# Patient Record
Sex: FEMALE | Race: WHITE | NOT HISPANIC OR LATINO | Employment: FULL TIME | URBAN - METROPOLITAN AREA
[De-identification: names, ages, dates, MRNs, and addresses within clinical notes are randomized per-mention and may not be internally consistent; named-entity substitution may affect disease eponyms.]

---

## 2021-06-10 ENCOUNTER — ANCILLARY PROCEDURE (OUTPATIENT)
Dept: ULTRASOUND IMAGING | Facility: CLINIC | Age: 21
End: 2021-06-10
Attending: EMERGENCY MEDICINE
Payer: COMMERCIAL

## 2021-06-10 ENCOUNTER — APPOINTMENT (OUTPATIENT)
Dept: CT IMAGING | Facility: CLINIC | Age: 21
End: 2021-06-10
Attending: EMERGENCY MEDICINE
Payer: COMMERCIAL

## 2021-06-10 ENCOUNTER — HOSPITAL ENCOUNTER (OUTPATIENT)
Facility: CLINIC | Age: 21
Setting detail: OBSERVATION
Discharge: HOME | End: 2021-06-11
Attending: EMERGENCY MEDICINE | Admitting: INTERNAL MEDICINE
Payer: COMMERCIAL

## 2021-06-10 DIAGNOSIS — R10.13 ABDOMINAL PAIN, EPIGASTRIC: Primary | ICD-10-CM

## 2021-06-10 DIAGNOSIS — R74.01 ELEVATED TRANSAMINASE LEVEL: ICD-10-CM

## 2021-06-10 LAB
ALBUMIN SERPL-MCNC: 3.7 G/DL (ref 3.4–5)
ALP SERPL-CCNC: 237 U/L (ref 40–150)
ALT SERPL W P-5'-P-CCNC: 253 U/L (ref 0–50)
ANION GAP SERPL CALCULATED.3IONS-SCNC: 10 MMOL/L (ref 3–14)
AST SERPL W P-5'-P-CCNC: ABNORMAL U/L (ref 0–45)
B-HCG SERPL-ACNC: <1 IU/L (ref 0–5)
BILIRUB SERPL-MCNC: 0.8 MG/DL (ref 0.2–1.3)
BUN SERPL-MCNC: 7 MG/DL (ref 7–30)
CALCIUM SERPL-MCNC: 9.2 MG/DL (ref 8.5–10.1)
CHLORIDE SERPL-SCNC: 101 MMOL/L (ref 94–109)
CO2 SERPL-SCNC: 21 MMOL/L (ref 20–32)
CREAT SERPL-MCNC: 0.78 MG/DL (ref 0.52–1.04)
GFR SERPL CREATININE-BSD FRML MDRD: >90 ML/MIN/{1.73_M2}
GLUCOSE SERPL-MCNC: 64 MG/DL (ref 70–99)
LACTATE BLD-SCNC: 1.3 MMOL/L (ref 0.7–2)
LIPASE SERPL-CCNC: 30 U/L (ref 73–393)
POTASSIUM SERPL-SCNC: 4 MMOL/L (ref 3.4–5.3)
PROT SERPL-MCNC: 7.8 G/DL (ref 6.8–8.8)
SODIUM SERPL-SCNC: 132 MMOL/L (ref 133–144)

## 2021-06-10 PROCEDURE — 82247 BILIRUBIN TOTAL: CPT

## 2021-06-10 PROCEDURE — 84702 CHORIONIC GONADOTROPIN TEST: CPT | Performed by: EMERGENCY MEDICINE

## 2021-06-10 PROCEDURE — 80048 BASIC METABOLIC PNL TOTAL CA: CPT

## 2021-06-10 PROCEDURE — 99285 EMERGENCY DEPT VISIT HI MDM: CPT | Mod: 25 | Performed by: EMERGENCY MEDICINE

## 2021-06-10 PROCEDURE — 76705 ECHO EXAM OF ABDOMEN: CPT | Mod: 26 | Performed by: EMERGENCY MEDICINE

## 2021-06-10 PROCEDURE — 250N000013 HC RX MED GY IP 250 OP 250 PS 637: Performed by: EMERGENCY MEDICINE

## 2021-06-10 PROCEDURE — 250N000011 HC RX IP 250 OP 636: Performed by: EMERGENCY MEDICINE

## 2021-06-10 PROCEDURE — 84155 ASSAY OF PROTEIN SERUM: CPT

## 2021-06-10 PROCEDURE — 96361 HYDRATE IV INFUSION ADD-ON: CPT | Performed by: EMERGENCY MEDICINE

## 2021-06-10 PROCEDURE — 84460 ALANINE AMINO (ALT) (SGPT): CPT

## 2021-06-10 PROCEDURE — 82040 ASSAY OF SERUM ALBUMIN: CPT

## 2021-06-10 PROCEDURE — 258N000003 HC RX IP 258 OP 636: Performed by: EMERGENCY MEDICINE

## 2021-06-10 PROCEDURE — 74177 CT ABD & PELVIS W/CONTRAST: CPT

## 2021-06-10 PROCEDURE — 96374 THER/PROPH/DIAG INJ IV PUSH: CPT | Performed by: EMERGENCY MEDICINE

## 2021-06-10 PROCEDURE — 76705 ECHO EXAM OF ABDOMEN: CPT | Performed by: EMERGENCY MEDICINE

## 2021-06-10 PROCEDURE — 83605 ASSAY OF LACTIC ACID: CPT | Performed by: EMERGENCY MEDICINE

## 2021-06-10 PROCEDURE — 83690 ASSAY OF LIPASE: CPT | Performed by: EMERGENCY MEDICINE

## 2021-06-10 PROCEDURE — 84075 ASSAY ALKALINE PHOSPHATASE: CPT

## 2021-06-10 PROCEDURE — 85025 COMPLETE CBC W/AUTO DIFF WBC: CPT | Performed by: EMERGENCY MEDICINE

## 2021-06-10 PROCEDURE — 74177 CT ABD & PELVIS W/CONTRAST: CPT | Mod: 26 | Performed by: RADIOLOGY

## 2021-06-10 RX ORDER — ONDANSETRON 2 MG/ML
4 INJECTION INTRAMUSCULAR; INTRAVENOUS ONCE
Status: COMPLETED | OUTPATIENT
Start: 2021-06-10 | End: 2021-06-10

## 2021-06-10 RX ORDER — ACETAMINOPHEN 500 MG
1000 TABLET ORAL ONCE
Status: COMPLETED | OUTPATIENT
Start: 2021-06-10 | End: 2021-06-10

## 2021-06-10 RX ORDER — NORETHINDRONE ACETATE AND ETHINYL ESTRADIOL 1; 5 MG/1; UG/1
1 TABLET ORAL DAILY
COMMUNITY

## 2021-06-10 RX ORDER — FLUTICASONE PROPIONATE 50 MCG
2 SPRAY, SUSPENSION (ML) NASAL DAILY
COMMUNITY
Start: 2020-06-01

## 2021-06-10 RX ORDER — IOPAMIDOL 755 MG/ML
75 INJECTION, SOLUTION INTRAVASCULAR ONCE
Status: COMPLETED | OUTPATIENT
Start: 2021-06-10 | End: 2021-06-10

## 2021-06-10 RX ADMIN — ACETAMINOPHEN 1000 MG: 500 TABLET, FILM COATED ORAL at 21:37

## 2021-06-10 RX ADMIN — ONDANSETRON 4 MG: 2 INJECTION INTRAMUSCULAR; INTRAVENOUS at 21:38

## 2021-06-10 RX ADMIN — SODIUM CHLORIDE 1000 ML: 9 INJECTION, SOLUTION INTRAVENOUS at 21:46

## 2021-06-10 RX ADMIN — IOPAMIDOL 75 ML: 755 INJECTION, SOLUTION INTRAVENOUS at 23:20

## 2021-06-10 RX ADMIN — SODIUM CHLORIDE 1000 ML: 9 INJECTION, SOLUTION INTRAVENOUS at 19:37

## 2021-06-10 ASSESSMENT — MIFFLIN-ST. JEOR: SCORE: 1223.09

## 2021-06-10 NOTE — ED TRIAGE NOTES
Pt comes in with two days abdominal pain, low grade temp (T100) for last four days, decreased appetite and vomiting. Went to urgent care yesterday and tested negative for COVID.

## 2021-06-11 ENCOUNTER — APPOINTMENT (OUTPATIENT)
Dept: ULTRASOUND IMAGING | Facility: CLINIC | Age: 21
End: 2021-06-11
Attending: PHYSICIAN ASSISTANT
Payer: COMMERCIAL

## 2021-06-11 VITALS
TEMPERATURE: 98.3 F | BODY MASS INDEX: 23.09 KG/M2 | HEART RATE: 81 BPM | DIASTOLIC BLOOD PRESSURE: 73 MMHG | SYSTOLIC BLOOD PRESSURE: 112 MMHG | HEIGHT: 61 IN | OXYGEN SATURATION: 100 % | RESPIRATION RATE: 16 BRPM | WEIGHT: 122.3 LBS

## 2021-06-11 DIAGNOSIS — R10.13 ABDOMINAL PAIN, EPIGASTRIC: Primary | ICD-10-CM

## 2021-06-11 DIAGNOSIS — R74.01 ELEVATED TRANSAMINASE LEVEL: ICD-10-CM

## 2021-06-11 LAB
ALBUMIN SERPL-MCNC: 2.8 G/DL (ref 3.4–5)
ALBUMIN UR-MCNC: 20 MG/DL
ALP SERPL-CCNC: 205 U/L (ref 40–150)
ALT SERPL W P-5'-P-CCNC: 227 U/L (ref 0–50)
ANION GAP SERPL CALCULATED.3IONS-SCNC: 10 MMOL/L (ref 3–14)
APPEARANCE UR: CLEAR
AST SERPL W P-5'-P-CCNC: 207 U/L (ref 0–45)
BASOPHILS # BLD AUTO: 0 10E9/L (ref 0–0.2)
BASOPHILS NFR BLD AUTO: 0 %
BILIRUB SERPL-MCNC: 0.8 MG/DL (ref 0.2–1.3)
BILIRUB UR QL STRIP: NEGATIVE
BUN SERPL-MCNC: 7 MG/DL (ref 7–30)
CALCIUM SERPL-MCNC: 7.5 MG/DL (ref 8.5–10.1)
CHLORIDE SERPL-SCNC: 110 MMOL/L (ref 94–109)
CMV IGG SERPL QL IA: <0.2 AI (ref 0–0.8)
CMV IGM SERPL QL IA: 0.8 AI (ref 0–0.8)
CO2 SERPL-SCNC: 17 MMOL/L (ref 20–32)
COLOR UR AUTO: YELLOW
CREAT SERPL-MCNC: 0.71 MG/DL (ref 0.52–1.04)
DIFFERENTIAL METHOD BLD: ABNORMAL
EBV VCA IGG SER QL IA: <0.2 AI (ref 0–0.8)
EOSINOPHIL # BLD AUTO: 0 10E9/L (ref 0–0.7)
EOSINOPHIL NFR BLD AUTO: 0 %
ERYTHROCYTE [DISTWIDTH] IN BLOOD BY AUTOMATED COUNT: 11.8 % (ref 10–15)
ERYTHROCYTE [DISTWIDTH] IN BLOOD BY AUTOMATED COUNT: 11.9 % (ref 10–15)
GFR SERPL CREATININE-BSD FRML MDRD: >90 ML/MIN/{1.73_M2}
GLUCOSE BLDC GLUCOMTR-MCNC: 62 MG/DL (ref 70–99)
GLUCOSE SERPL-MCNC: 57 MG/DL (ref 70–99)
GLUCOSE UR STRIP-MCNC: NEGATIVE MG/DL
HAV IGG SER QL IA: NONREACTIVE
HAV IGM SERPL QL IA: NONREACTIVE
HBV CORE IGM SERPL QL IA: NONREACTIVE
HBV SURFACE AB SERPL IA-ACNC: 3.88 M[IU]/ML
HBV SURFACE AG SERPL QL IA: NONREACTIVE
HCT VFR BLD AUTO: 35.8 % (ref 35–47)
HCT VFR BLD AUTO: 42.9 % (ref 35–47)
HCV AB SERPL QL IA: NONREACTIVE
HGB BLD-MCNC: 11.8 G/DL (ref 11.7–15.7)
HGB BLD-MCNC: 14.4 G/DL (ref 11.7–15.7)
HGB UR QL STRIP: NEGATIVE
INR PPP: 1.12 (ref 0.86–1.14)
KETONES UR STRIP-MCNC: 150 MG/DL
LEUKOCYTE ESTERASE UR QL STRIP: NEGATIVE
LYMPHOCYTES # BLD AUTO: 5.3 10E9/L (ref 0.8–5.3)
LYMPHOCYTES NFR BLD AUTO: 65 %
MCH RBC QN AUTO: 29 PG (ref 26.5–33)
MCH RBC QN AUTO: 29.1 PG (ref 26.5–33)
MCHC RBC AUTO-ENTMCNC: 33 G/DL (ref 31.5–36.5)
MCHC RBC AUTO-ENTMCNC: 33.6 G/DL (ref 31.5–36.5)
MCV RBC AUTO: 87 FL (ref 78–100)
MCV RBC AUTO: 88 FL (ref 78–100)
MONOCYTES # BLD AUTO: 1.7 10E9/L (ref 0–1.3)
MONOCYTES NFR BLD AUTO: 21 %
MUCOUS THREADS #/AREA URNS LPF: PRESENT /LPF
NEUTROPHILS # BLD AUTO: 1.1 10E9/L (ref 1.6–8.3)
NEUTROPHILS NFR BLD AUTO: 14 %
NITRATE UR QL: NEGATIVE
PH UR STRIP: 6 PH (ref 5–7)
PLATELET # BLD AUTO: 117 10E9/L (ref 150–450)
PLATELET # BLD AUTO: 92 10E9/L (ref 150–450)
POTASSIUM SERPL-SCNC: 3.7 MMOL/L (ref 3.4–5.3)
PROT SERPL-MCNC: 5.9 G/DL (ref 6.8–8.8)
RBC # BLD AUTO: 4.06 10E12/L (ref 3.8–5.2)
RBC # BLD AUTO: 4.96 10E12/L (ref 3.8–5.2)
RBC #/AREA URNS AUTO: 4 /HPF (ref 0–2)
SODIUM SERPL-SCNC: 137 MMOL/L (ref 133–144)
SOURCE: ABNORMAL
SP GR UR STRIP: 1.01 (ref 1–1.03)
SQUAMOUS #/AREA URNS AUTO: 1 /HPF (ref 0–1)
UROBILINOGEN UR STRIP-MCNC: NORMAL MG/DL (ref 0–2)
VARIANT LYMPHS BLD QL SMEAR: PRESENT
WBC # BLD AUTO: 5.2 10E9/L (ref 4–11)
WBC # BLD AUTO: 8.1 10E9/L (ref 4–11)
WBC #/AREA URNS AUTO: 1 /HPF (ref 0–5)

## 2021-06-11 PROCEDURE — 99204 OFFICE O/P NEW MOD 45 MIN: CPT | Mod: GC | Performed by: STUDENT IN AN ORGANIZED HEALTH CARE EDUCATION/TRAINING PROGRAM

## 2021-06-11 PROCEDURE — 99235 HOSP IP/OBS SAME DATE MOD 70: CPT | Performed by: INTERNAL MEDICINE

## 2021-06-11 PROCEDURE — 85610 PROTHROMBIN TIME: CPT | Performed by: STUDENT IN AN ORGANIZED HEALTH CARE EDUCATION/TRAINING PROGRAM

## 2021-06-11 PROCEDURE — 36415 COLL VENOUS BLD VENIPUNCTURE: CPT | Performed by: EMERGENCY MEDICINE

## 2021-06-11 PROCEDURE — 85027 COMPLETE CBC AUTOMATED: CPT | Performed by: NURSE PRACTITIONER

## 2021-06-11 PROCEDURE — 250N000013 HC RX MED GY IP 250 OP 250 PS 637: Performed by: NURSE PRACTITIONER

## 2021-06-11 PROCEDURE — G0378 HOSPITAL OBSERVATION PER HR: HCPCS

## 2021-06-11 PROCEDURE — 86708 HEPATITIS A ANTIBODY: CPT | Performed by: EMERGENCY MEDICINE

## 2021-06-11 PROCEDURE — 80053 COMPREHEN METABOLIC PANEL: CPT | Performed by: NURSE PRACTITIONER

## 2021-06-11 PROCEDURE — 86709 HEPATITIS A IGM ANTIBODY: CPT | Performed by: STUDENT IN AN ORGANIZED HEALTH CARE EDUCATION/TRAINING PROGRAM

## 2021-06-11 PROCEDURE — 96376 TX/PRO/DX INJ SAME DRUG ADON: CPT

## 2021-06-11 PROCEDURE — 86644 CMV ANTIBODY: CPT | Performed by: EMERGENCY MEDICINE

## 2021-06-11 PROCEDURE — 93975 VASCULAR STUDY: CPT | Mod: 26 | Performed by: RADIOLOGY

## 2021-06-11 PROCEDURE — 86665 EPSTEIN-BARR CAPSID VCA: CPT | Performed by: NURSE PRACTITIONER

## 2021-06-11 PROCEDURE — 86645 CMV ANTIBODY IGM: CPT | Performed by: STUDENT IN AN ORGANIZED HEALTH CARE EDUCATION/TRAINING PROGRAM

## 2021-06-11 PROCEDURE — 36415 COLL VENOUS BLD VENIPUNCTURE: CPT | Performed by: NURSE PRACTITIONER

## 2021-06-11 PROCEDURE — 81001 URINALYSIS AUTO W/SCOPE: CPT | Performed by: EMERGENCY MEDICINE

## 2021-06-11 PROCEDURE — 36415 COLL VENOUS BLD VENIPUNCTURE: CPT | Performed by: STUDENT IN AN ORGANIZED HEALTH CARE EDUCATION/TRAINING PROGRAM

## 2021-06-11 PROCEDURE — 999N001017 HC STATISTIC GLUCOSE BY METER IP

## 2021-06-11 PROCEDURE — 86803 HEPATITIS C AB TEST: CPT | Performed by: EMERGENCY MEDICINE

## 2021-06-11 PROCEDURE — 250N000011 HC RX IP 250 OP 636: Performed by: NURSE PRACTITIONER

## 2021-06-11 PROCEDURE — 99217 PR OBSERVATION CARE DISCHARGE: CPT | Performed by: PHYSICIAN ASSISTANT

## 2021-06-11 PROCEDURE — 258N000003 HC RX IP 258 OP 636: Performed by: NURSE PRACTITIONER

## 2021-06-11 PROCEDURE — 86705 HEP B CORE ANTIBODY IGM: CPT | Performed by: STUDENT IN AN ORGANIZED HEALTH CARE EDUCATION/TRAINING PROGRAM

## 2021-06-11 PROCEDURE — 93975 VASCULAR STUDY: CPT

## 2021-06-11 PROCEDURE — 87340 HEPATITIS B SURFACE AG IA: CPT | Performed by: EMERGENCY MEDICINE

## 2021-06-11 PROCEDURE — 86706 HEP B SURFACE ANTIBODY: CPT | Performed by: EMERGENCY MEDICINE

## 2021-06-11 RX ORDER — IBUPROFEN 600 MG/1
600 TABLET, FILM COATED ORAL EVERY 6 HOURS PRN
Status: DISCONTINUED | OUTPATIENT
Start: 2021-06-11 | End: 2021-06-11 | Stop reason: HOSPADM

## 2021-06-11 RX ORDER — ONDANSETRON 4 MG/1
4 TABLET, ORALLY DISINTEGRATING ORAL EVERY 6 HOURS PRN
Qty: 10 TABLET | Refills: 0 | Status: SHIPPED | OUTPATIENT
Start: 2021-06-11

## 2021-06-11 RX ORDER — ONDANSETRON 2 MG/ML
4 INJECTION INTRAMUSCULAR; INTRAVENOUS EVERY 6 HOURS PRN
Status: DISCONTINUED | OUTPATIENT
Start: 2021-06-11 | End: 2021-06-11 | Stop reason: HOSPADM

## 2021-06-11 RX ORDER — LIDOCAINE 40 MG/G
CREAM TOPICAL
Status: DISCONTINUED | OUTPATIENT
Start: 2021-06-11 | End: 2021-06-11 | Stop reason: HOSPADM

## 2021-06-11 RX ORDER — SODIUM CHLORIDE 9 MG/ML
INJECTION, SOLUTION INTRAVENOUS CONTINUOUS
Status: DISCONTINUED | OUTPATIENT
Start: 2021-06-11 | End: 2021-06-11 | Stop reason: HOSPADM

## 2021-06-11 RX ORDER — PROCHLORPERAZINE MALEATE 10 MG
10 TABLET ORAL EVERY 6 HOURS PRN
Status: DISCONTINUED | OUTPATIENT
Start: 2021-06-11 | End: 2021-06-11 | Stop reason: HOSPADM

## 2021-06-11 RX ORDER — ONDANSETRON 4 MG/1
4 TABLET, ORALLY DISINTEGRATING ORAL EVERY 6 HOURS PRN
Status: DISCONTINUED | OUTPATIENT
Start: 2021-06-11 | End: 2021-06-11 | Stop reason: HOSPADM

## 2021-06-11 RX ORDER — PROCHLORPERAZINE 25 MG
25 SUPPOSITORY, RECTAL RECTAL EVERY 12 HOURS PRN
Status: DISCONTINUED | OUTPATIENT
Start: 2021-06-11 | End: 2021-06-11 | Stop reason: HOSPADM

## 2021-06-11 RX ADMIN — IBUPROFEN 600 MG: 600 TABLET, FILM COATED ORAL at 05:11

## 2021-06-11 RX ADMIN — SODIUM CHLORIDE 1000 ML: 9 INJECTION, SOLUTION INTRAVENOUS at 00:50

## 2021-06-11 RX ADMIN — ONDANSETRON 4 MG: 2 INJECTION INTRAMUSCULAR; INTRAVENOUS at 05:11

## 2021-06-11 RX ADMIN — SODIUM CHLORIDE: 9 INJECTION, SOLUTION INTRAVENOUS at 10:39

## 2021-06-11 RX ADMIN — ONDANSETRON 4 MG: 2 INJECTION INTRAMUSCULAR; INTRAVENOUS at 11:30

## 2021-06-11 RX ADMIN — IBUPROFEN 600 MG: 600 TABLET, FILM COATED ORAL at 11:31

## 2021-06-11 RX ADMIN — SODIUM CHLORIDE: 9 INJECTION, SOLUTION INTRAVENOUS at 01:42

## 2021-06-11 ASSESSMENT — MIFFLIN-ST. JEOR: SCORE: 1247.13

## 2021-06-11 NOTE — PROGRESS NOTES
"Patient interviewed and examined. Labs, imaging and chart notes reviewed.  Jennifer Mejia presented to the ED yesterday with 5 days of epigastric abdominal pain worse with eating, nausea, vomiting, subjective fevers, facial pain and pressure. She was found to have elevated LFTS. RUQ US was normal. Abdominal CT had normal appendix. Free fluid in the pelvis and unremarkable liver and gallbladder. Hepatitis A, B, and C serology is negative. She had reactive lymphocytes on blood smear. CMV antigen is negative. EBV capsid Elis is negative. Monospot and early antigen is pending.     Today, she is feeling improved. Nausea and abdominal pain are both improved. She has no further nausea. She is tolerating liquids.     Past Medical History:   Diagnosis Date     Migraine        History reviewed. No pertinent surgical history.    History reviewed. No pertinent family history.    Social History     Tobacco Use     Smoking status: Never Smoker     Smokeless tobacco: Never Used   Substance Use Topics     Alcohol use: Yes     Comment: occasional     Physical Exam:  Young female in NAD.  /73 (BP Location: Left arm)   Pulse 81   Temp 98.3  F (36.8  C) (Oral)   Resp 16   Ht 1.549 m (5' 1\")   Wt 55.5 kg (122 lb 4.8 oz)   SpO2 100%   BMI 23.11 kg/m    HEENT: PERRLA. EOMI.  Neck: Supple. No adenopathy.  Lungs: Clear to auscultation.  Cardiac: RRR. Normal S1 and S2. No murmurs.   Abdomen: Soft, non tender. No HSM.   Extrem: No edema.  Skin: No rash.  Neuro: Non focal.  Psych: Normal mood and affect.    Impression:  Young woman presents with 5 days of abdominal pain, nausea, vomiting, mildly elevated LFTs, Nasal congestion and reactive lymphocytes. She was vaccinated for Covid in April with [employer] vaccine. Serology for CMV, EBV, Hepatitis A, B, and C are negative, though EBV early antigen is pending. RUQ US was normal other than mild splenomegaly. Covid-19 was negative in urgent care 2 days ago. She has no exposure to " medications, alcohol, herbal medications. Overall, her picture is most suggestive of  is consistent with viral syndrome with mononucleosis like picture. We are awaiting final recommendations from GI Hepatology. She likely can be discharged to home later today with close follow up in clinic with recheck LFTs early next week.

## 2021-06-11 NOTE — PLAN OF CARE
Observation Goals:      List all goals to be met before discharge home   - Nausea/vomiting (diarrhea if present) improved: improving, pt denies nausea at this time.   - Tolerating oral intake to maintain hydration:met, pt able to tolerate sips of applejuice and water   - Vital signs normal or at patient baseline and orthostatic vitals are normal and patient not lightheaded with standing: met   - Diagnostic tests and consults completed and resulted if ordered: met  - Adequate pain control on oral analgesia: met   - Tolerating oral antibiotics if ordered: NA   - Safe disposition plan has been identified: met   -Nurse to notify provider when observation goals have been met and patient is ready for discharge.

## 2021-06-11 NOTE — PLAN OF CARE
Observation Goals:     List all goals to be met before discharge home   - Nausea/vomiting (diarrhea if present) improved: improving, intermittent nausea improved with zofran.   - Tolerating oral intake to maintain hydration: IV fluids infusing, pt is NPO except ice chips   - Vital signs normal or at patient baseline and orthostatic vitals are normal and patient not lightheaded with standing: met   - Diagnostic tests and consults completed and resulted if ordered: not met, ultrasound to be completed   - Adequate pain control on oral analgesia: met   - Tolerating oral antibiotics if ordered: NA   - Safe disposition plan has been identified: met   -Nurse to notify provider when observation goals have been met and patient is ready for discharge.

## 2021-06-11 NOTE — DISCHARGE SUMMARY
"ED Observation Discharge Summary    Jennifer Mejia   MRN# [id number]  Age: 21 year old   YOB: 2000            Date of Admission:  6/10/2021    Date of Discharge:  6/11/2021  Admitting Physician:  Nelson Frausto MD  Discharge Physician: Nelson Frausto MD  NP/PA: Jeanine Jalloh PA-C      DISCHARGE DIAGNOSIS:     Abdominal pain, epigastric    Elevated transaminase level    * No resolved hospital problems. *      INTERVAL HISTORY:    PHYSICAL EXAM:   Blood pressure 112/73, pulse 81, temperature 98.3  F (36.8  C), temperature source Oral, resp. rate 16, height 1.549 m (5' 1\"), weight 55.5 kg (122 lb 4.8 oz), SpO2 100 %.     GENERAL APPEARENCE: Pleasant, generally appears well, A/O x4. NAD.  SKIN: Clean, dry, and intact without visible lesions, rash, jaundice, cyanosis, erythema, ecchymoses to exposed areas. No hair or nail changes.  HEENT/NECK: NCAT w/out masses, lesions, or abnormalities. Sclera anicteric, PERRLA, EOMI.  Oral mucosa pink and moist without erythema, exudate, lesions, ulcerations, or thrush. Teeth and gums normal. Neck supple, no masses. No jugular venous distention.   CARDIOVASCULAR: S1, S2 RRR. No murmurs, rubs, or gallops.   RESPIRATORY: Respiratory effort WNL. CTA  bilaterally without crackles/rales/wheeze   GI: Active BS in all 4 quadrants. Abdomen soft and non-tender. No masses or hepatosplenomegaly.  : Deferred  MUSCULOSKELETAL: Gait is steady. Strength 5/5 in major muscle groups of bilateral UE and LE.  Extremities normal, no gross deformities noted, non-tender to palpation.   PV: 2+ bilateral radial and pedal pulses. No edema noted.   NEURO: CN II-XII grossly intact. Speech normal. Appropriate throughout interview.   Sensation grossly WNL. Finger to nose and rapid alternating movements WDL  HEME/LYMPH: No visible bleeding. No palpable submandibular, submental, pre or postauricular, occipital, cervical, or supraclavicular lymph nodes  PSYCHIATRIC: Mentation " and affect appear normal  VASCULAR ACCESS: CDI without erythema or discharge. Non-tender.    PROCEDURES AND IMAGING:   Results for orders placed or performed during the hospital encounter of 06/10/21 (from the past 24 hour(s))   Lipase   Result Value Ref Range    Lipase 30 (L) 73 - 393 U/L   CBC with platelets differential   Result Value Ref Range    WBC 8.1 4.0 - 11.0 10e9/L    RBC Count 4.96 3.8 - 5.2 10e12/L    Hemoglobin 14.4 11.7 - 15.7 g/dL    Hematocrit 42.9 35.0 - 47.0 %    MCV 87 78 - 100 fl    MCH 29.0 26.5 - 33.0 pg    MCHC 33.6 31.5 - 36.5 g/dL    RDW 11.8 10.0 - 15.0 %    Platelet Count 117 (L) 150 - 450 10e9/L    Diff Method Manual Differential     % Neutrophils 14.0 %    % Lymphocytes 65.0 %    % Monocytes 21.0 %    % Eosinophils 0.0 %    % Basophils 0.0 %    Absolute Neutrophil 1.1 (L) 1.6 - 8.3 10e9/L    Absolute Lymphocytes 5.3 0.8 - 5.3 10e9/L    Absolute Monocytes 1.7 (H) 0.0 - 1.3 10e9/L    Absolute Eosinophils 0.0 0.0 - 0.7 10e9/L    Absolute Basophils 0.0 0.0 - 0.2 10e9/L    Reactive Lymphs Present    Comprehensive metabolic panel   Result Value Ref Range    Sodium 132 (L) 133 - 144 mmol/L    Potassium 4.0 3.4 - 5.3 mmol/L    Chloride 101 94 - 109 mmol/L    Carbon Dioxide 21 20 - 32 mmol/L    Anion Gap 10 3 - 14 mmol/L    Glucose 64 (L) 70 - 99 mg/dL    Urea Nitrogen 7 7 - 30 mg/dL    Creatinine 0.78 0.52 - 1.04 mg/dL    GFR Estimate >90 >60 mL/min/[1.73_m2]    GFR Estimate If Black >90 >60 mL/min/[1.73_m2]    Calcium 9.2 8.5 - 10.1 mg/dL    Bilirubin Total 0.8 0.2 - 1.3 mg/dL    Albumin 3.7 3.4 - 5.0 g/dL    Protein Total 7.8 6.8 - 8.8 g/dL    Alkaline Phosphatase 237 (H) 40 - 150 U/L     (H) 0 - 50 U/L    AST Canceled, Test credited 0 - 45 U/L   HCG quantitative pregnancy   Result Value Ref Range    HCG Quantitative Serum <1 0 - 5 IU/L   POC US ABDOMEN LIMITED    Impression    Limited Bedside Abdominal Ultrasound, performed and interpreted by me.     Indication: abdominal pain.  Evaluate for gallbladder disease.    Windows: RUQ: longitudinal, transverse.    Findings: No gallstones noted. No gallbladder wall thickening. No pericholecystic fluid.    IMPRESSION: No cholelithiasis. No evidence of cholecystitis.   Lactate for Sepsis Protocol   Result Value Ref Range    Lactate for Sepsis Protocol 1.3 0.7 - 2.0 mmol/L   CT Abdomen Pelvis w Contrast    Narrative    EXAM: CT ABDOMEN PELVIS W CONTRAST  LOCATION: [facility]  DATE/TIME: 6/10/2021 11:18 PM    INDICATION: RLQ abdominal pain, appendicitis suspected (Age >= 14y)  COMPARISON: None.  TECHNIQUE: CT scan of the abdomen and pelvis was performed following injection of IV contrast. Multiplanar reformats were obtained. Dose reduction techniques were used.  CONTRAST: 75 mL of Isovue-370.    FINDINGS:   LOWER CHEST: Trace amounts of pleural fluid with adjacent atelectasis.    HEPATOBILIARY: Normal.    PANCREAS: Normal.    SPLEEN: Normal.    ADRENAL GLANDS: Normal.    KIDNEYS/BLADDER: Symmetric enhancement. No hydronephrosis. Bladder is normal.    BOWEL: No mechanical bowel obstruction. No evidence of appendicitis. No free air. No focal bowel wall thickening.    LYMPH NODES: Normal.    VASCULATURE: Unremarkable.    PELVIC ORGANS: Free fluid throughout the dependent aspects of the pelvis. Adnexal structures appear symmetric.    MUSCULOSKELETAL: Normal.      Impression    IMPRESSION:   1.  Pelvic free fluid, potentially physiologic in nature.    2.  No focal inflammatory change in the abdomen or pelvis. Appendix is nondilated.    3.  Tiny pleural effusions.   Routine UA with microscopic   Result Value Ref Range    Color Urine Yellow     Appearance Urine Clear     Glucose Urine Negative NEG^Negative mg/dL    Bilirubin Urine Negative NEG^Negative    Ketones Urine 150 (A) NEG^Negative mg/dL    Specific Gravity Urine 1.015 1.003 - 1.035    Blood Urine Negative NEG^Negative    pH Urine 6.0 5.0 - 7.0 pH    Protein Albumin Urine 20 (A)  NEG^Negative mg/dL    Urobilinogen mg/dL Normal 0.0 - 2.0 mg/dL    Nitrite Urine Negative NEG^Negative    Leukocyte Esterase Urine Negative NEG^Negative    Source Midstream Urine     WBC Urine 1 0 - 5 /HPF    RBC Urine 4 (H) 0 - 2 /HPF    Squamous Epithelial /HPF Urine 1 0 - 1 /HPF    Mucous Urine Present (A) NEG^Negative /LPF   Hepatitis A Antibody IgG   Result Value Ref Range    Hepatitis A Antibody IgG Nonreactive NR^Nonreactive   CMV Antibody IgG   Result Value Ref Range    CMV Antibody IgG <0.2 0.0 - 0.8 AI   Hepatitis C antibody   Result Value Ref Range    Hepatitis C Antibody Nonreactive NR^Nonreactive   Hepatitis B surface antigen   Result Value Ref Range    Hep B Surface Agn Nonreactive NR^Nonreactive   Hepatitis B Surface Antibody   Result Value Ref Range    Hepatitis B Surface Antibody 3.88 <8.00 m[IU]/mL   Comprehensive metabolic panel   Result Value Ref Range    Sodium 137 133 - 144 mmol/L    Potassium 3.7 3.4 - 5.3 mmol/L    Chloride 110 (H) 94 - 109 mmol/L    Carbon Dioxide 17 (L) 20 - 32 mmol/L    Anion Gap 10 3 - 14 mmol/L    Glucose 57 (L) 70 - 99 mg/dL    Urea Nitrogen 7 7 - 30 mg/dL    Creatinine 0.71 0.52 - 1.04 mg/dL    GFR Estimate >90 >60 mL/min/[1.73_m2]    GFR Estimate If Black >90 >60 mL/min/[1.73_m2]    Calcium 7.5 (L) 8.5 - 10.1 mg/dL    Bilirubin Total 0.8 0.2 - 1.3 mg/dL    Albumin 2.8 (L) 3.4 - 5.0 g/dL    Protein Total 5.9 (L) 6.8 - 8.8 g/dL    Alkaline Phosphatase 205 (H) 40 - 150 U/L     (H) 0 - 50 U/L     (H) 0 - 45 U/L   CBC with platelets   Result Value Ref Range    WBC 5.2 4.0 - 11.0 10e9/L    RBC Count 4.06 3.8 - 5.2 10e12/L    Hemoglobin 11.8 11.7 - 15.7 g/dL    Hematocrit 35.8 35.0 - 47.0 %    MCV 88 78 - 100 fl    MCH 29.1 26.5 - 33.0 pg    MCHC 33.0 31.5 - 36.5 g/dL    RDW 11.9 10.0 - 15.0 %    Platelet Count 92 (L) 150 - 450 10e9/L   EBV Capsid Antibody IgG   Result Value Ref Range    EBV Capsid Antibody IgG <0.2 0.0 - 0.8 AI   US Abdomen Complete w Doppler  Complete    Narrative    EXAMINATION: US ABDOMEN COMPLETE WITH DOPPLER COMPLETE 6/11/2021 9:56  AM     COMPARISON: CT abdomen and pelvis 6/10/2021    HISTORY: Elevated liver enzymes. Nausea vomiting    TECHNIQUE: The abdomen was scanned in standard fashion with  specialized ultrasound transducer(s) using both gray-scale, color  Doppler, and spectral flow techniques.    Findings:  Liver: The liver demonstrates normal homogeneous echotexture. No  evidence of a focal hepatic mass. Measuring up to 19 cm in  craniocaudal dimension.    Extrahepatic portal vein flow is antegrade at 40.4 cm/s.  Right portal vein flow is antegrade, measuring 19 cm/s.  Left portal vein flow is antegrade, measuring 8 cm/s.    Flow in the hepatic artery is towards the liver and:  81.4 cm/s peak systolic  0.5 resistive index.     The splenic vein is patent and flow is towards the liver.  The left,  middle, and right hepatic veins are patent with flow towards the IVC.  The IVC is patent with flow towards the heart.   The visualized aorta  is not dilated. Visualized common iliac arteries are normal in  diameter.    Gallbladder: There is no wall thickening, pericholecystic fluid,  positive sonographic Nielsen's sign or evidence for cholelithiasis    Bile Ducts: Both the intra- and extrahepatic biliary system are of  normal caliber.  The common bile duct measures 2 mm.    Pancreas: Visualized portions of the head and body of the pancreas are  unremarkable.     Kidneys: Both kidneys are of normal echotexture, without mass or  hydronephrosis.   Renal lengths: right- 11.2 cm, left- 11.0 cm.    Spleen: The spleen measures 12.4 cm in length.    Fluid: No evidence of ascites or pleural effusions.      Impression    Impression:   1.  Normal abdominal ultrasound with Doppler assessment.  2.  Mild splenomegaly.    I have personally reviewed the examination and initial interpretation  and I agree with the findings.    MAN BORJA MD   Glucose by meter  "  Result Value Ref Range    Glucose 62 (L) 70 - 99 mg/dL   INR   Result Value Ref Range    INR 1.12 0.86 - 1.14     DISCHARGE MEDICATIONS:   Current Discharge Medication List      START taking these medications    Details   ondansetron (ZOFRAN-ODT) 4 MG ODT tab Take 1 tablet (4 mg) by mouth every 6 hours as needed for nausea or vomiting  Qty: 10 tablet, Refills: 0    Associated Diagnoses: Abdominal pain, epigastric         CONTINUE these medications which have NOT CHANGED    Details   Cetirizine HCl (ZYRTEC ALLERGY PO) Take 10 mg by mouth daily      fluticasone (FLONASE) 50 MCG/ACT nasal spray Spray 2 sprays in nostril daily      norethindrone-ethinyl estradiol (FEMHRT 1/5) 1-5 MG-MCG tablet Take 1 tablet by mouth daily               CONSULTATIONS:   Consultation during this admission received from:Gastroenterology    BRIEF HISTORY OF PRESENT ILLNESS:   Per ED note, \" Jennifer Mejia is a 21 year old female who presents with abdominal pain.  Patient reports 5 days of epigastric abdominal pain, nausea, and vomiting.  Tactile fevers at home (she does not have a thermometer to check her temperature).  Abdominal pain became more diffuse today so she came to be evaluated.  She denies any diarrhea.  No dysuria.  No vaginal bleeding or discharge.  No prior abdominal surgical history.  She takes continuous birth control and therefore does not have menstrual periods.  She denies heavy alcohol use or other substance use. \"      ED OBSERVATION COURSE:    Patient was admitted to Observation and evaluated by Gastroenterology.  She was worked up for possible sources of acute hepatitis, including serologies of infections and viral.  She was found to have slight splenomagaly on imaging as well as thrombocytopenia and elevated liver enzymes, indicating likely viral source.  She had improvement of her labs on recheck.  Her symptoms have improved and she is tolerating oral.  She is instructed to discharge with follow up labs on " Monday with PCP appointment.      DISCHARGE DISPOSITION:   Discharged to home. The patient was discharged in a stable condition and agreed to discharge plan.    DISCHARGE INSTRUCTIONS AND FOLLOW-UP:  Discharge Procedure Orders   Reason for your hospital stay   Order Comments: You were admitted to Observation for nausea, vomiting and abdominal pain.  You were found to have elevated liver enzymes.     Follow Up and recommended labs and tests   Order Comments: Follow up with primary care provider, Physician No Ref-Primary, within 7 days to evaluate medication change.  The following labs/tests are recommended: INR and liver enzymes on Monday, 6/14/21 with results to PCP.     Activity   Order Comments: Your activity upon discharge: activity as tolerated     Order Specific Question Answer Comments   Is discharge order? Yes      Discharge Instructions   Order Comments: Please advance your diet as tolerated, trying fluids first and solids as tolerated.    Please have repeat labs checked on Monday of INR, liver enzymes to ensure that they are normalizing.    Please follow up with your PCP early next week to discuss your hospital stay.     Diet   Order Comments: Follow this diet upon discharge: Orders Placed This Encounter  Regular diet, advance as tolerated.     Order Specific Question Answer Comments   Is discharge order? Yes       Attestation:   I have reviewed today's vital signs, notes, medications, labs and imaging.  Greater than 30 minutes was spent on this discharge summary.  Jeanine Jalloh PA-C  Emergency Department Observation Unit

## 2021-06-11 NOTE — H&P
[facility]    History and Physical - Emergency Department Observation Unit       Date of Admission:  6/10/2021    Assessment & Plan   Jennifer Mejia is a 21 year old female admitted on 6/10/2021. She has a PMH of seasonal allergies and migraine headaches who presents with abdominal pain, nausea and vomiting.      ##.Abdominal pain  ## Nausea, vomiting   ## Elevated LFTS   Patient reports 5 days of epigastric abdominal pain, nausea, and vomiting. She reports subjective fevers. She was seen at  and was tested for COVID. Her COVID was negative. She continued to feel unwell and was unable to tolerate oral intake.  She denies any diarrhea.  No dysuria.  No vaginal bleeding or discharge.  No prior abdominal surgical history.  She denies heavy alcohol use or other substance use. In the ED: Vitals:BP:110/67 Pulse: 85 Temp: 97.8 Resp: 15 SP02:100 % Labs:Na 132, K 4.0, Cr 0.78, GFR >90, BUN 7, LFTS elevated. Alk phos: 237m , AST cancelled test. Lipase 30,  BG 64, WBC 8.1, Hgb 14.4, Plt 117, Lactic acid 1.3, UA negative.Medications: NS x 3 liters, Tylenol 1 g PO x 1, Zofran 4 mg IV x 1 Imaging: POC US Abdomen: No cholelithiasis. No evidence of cholecystitis. CT abdomen/pelvis: Pelvic free fluid, potentially physiologic in nature. No focal inflammatory change in the abdomen or pelvis. Appendix is nondilated.Tiny pleural effusions Consults: GI consult Plan: Admit to ED observation for symptom management and GI consult. Patient reports constipation   - Antiemetics prn  - Continuous IVF  - Repeat CMP and CBC in am  - NPO for GI consult  - GI consult  - Follow hepatitis labs.     ##. Birth control She takes continuous birth control and therefore does not have menstrual periods.    - Hold Birth control while on obs.     ## Allergies: Hold PTA meds      Diet: NPO for Medical/Clinical Reasons Except for: Ice Chips    DVT Prophylaxis: Ambulate every shift  Fierro Catheter: not  "present  Code Status: Full Code           Disposition Plan   Expected discharge: Tomorrow, recommended to prior living arrangement once Able to tolerate oral intake. .  Entered: OLENA Alamo CNP 06/11/2021, 2:09 AM     The patient's care was discussed with the Bedside Nurse, Patient and Dr Bettencourt.    OLENA Alamo CNP  [facility]  Contact information available via [facility] Paging/Directory      ______________________________________________________________________    Chief Complaint   Abdominal pain, nausea and vomiting    History is obtained from the patient    History of Present Illness   Per ED note, \" Jennifer Mejia is a 21 year old female who presents with abdominal pain.  Patient reports 5 days of epigastric abdominal pain, nausea, and vomiting.  Tactile fevers at home (she does not have a thermometer to check her temperature).  Abdominal pain became more diffuse today so she came to be evaluated.  She denies any diarrhea.  No dysuria.  No vaginal bleeding or discharge.  No prior abdominal surgical history.  She takes continuous birth control and therefore does not have menstrual periods.  She denies heavy alcohol use or other substance use. \"        Review of Systems    The 10 point Review of Systems is negative other than noted in the HPI or here.  Abdominal pain, dry heaving, nausea, poor appetite     Past Medical History    I have reviewed this patient's medical history and updated it with pertinent information if needed.   Past Medical History:   Diagnosis Date     Migraine        Past Surgical History   I have reviewed this patient's surgical history and updated it with pertinent information if needed.  History reviewed. No pertinent surgical history.    Social History   I have reviewed this patient's social history and updated it with pertinent information if needed.  Social History     Tobacco Use     Smoking status: Never Smoker     " Smokeless tobacco: Never Used   Substance Use Topics     Alcohol use: Yes     Comment: occasional     Drug use: Not Currently           Prior to Admission Medications   Prior to Admission Medications   Prescriptions Last Dose Informant Patient Reported? Taking?   Cetirizine HCl (ZYRTEC ALLERGY PO)   Yes Yes   Sig: Take 10 mg by mouth daily   fluticasone (FLONASE) 50 MCG/ACT nasal spray   Yes Yes   Sig: Spray 2 sprays in nostril daily   norethindrone-ethinyl estradiol (FEMHRT 1/5) 1-5 MG-MCG tablet   Yes Yes   Sig: Take 1 tablet by mouth daily      Facility-Administered Medications: None     Allergies   Allergies   Allergen Reactions     Methylprednisolone Hives, Itching and Other (See Comments)       Physical Exam   Vital Signs: Temp: 97.8  F (36.6  C) Temp src: Oral BP: 110/67 Pulse: 85   Resp: 15 SpO2: 100 % O2 Device: None (Room air)    Weight: 122 lbs 4.8 oz    Constitutional: healthy, alert and no distress   Head: Normocephalic. No masses, lesions, tenderness or abnormalities   Neck: Neck supple. No adenopathy. Thyroid symmetric, normal size,, Carotids without bruits.   ENT: ENT exam normal, no neck nodes or sinus tenderness   Cardiovascular: RRR. No murmurs, clicks gallops or rub   Respiratory: . Good diaphragmatic excursion. Lungs clear   Gastrointestinal: Abdomen soft abdominal tenderness in epigastric region,. BS normal. No masses, organomegaly.   : Deferred   Musculoskeletal: extremities normal- no gross deformities noted, gait normal and normal muscle tone   Skin: no suspicious lesions or rashes   Neurologic: Gait normal. Reflexes normal and symmetric. Sensation grossly WNL.   Psychiatric: mentation appears normal and affect normal/bright   Hematologic/Lymphatic/Immunologic: normal ant/post cervical, axillary, supraclavicular and inguinal     Data   Data reviewed today: I reviewed all medications, new labs and imaging results over the last 24 hours.     Recent Labs   Lab 06/10/21  1937   WBC 8.1   HGB  14.4   MCV 87   *   *   POTASSIUM 4.0   CHLORIDE 101   CO2 21   BUN 7   CR 0.78   ANIONGAP 10   GAY 9.2   GLC 64*   ALBUMIN 3.7   PROTTOTAL 7.8   BILITOTAL 0.8   ALKPHOS 237*   *   AST Canceled, Test credited   LIPASE 30*     Most Recent 3 CBC's:  Recent Labs   Lab Test 06/10/21  1937   WBC 8.1   HGB 14.4   MCV 87   *     Most Recent 3 BMP's:  Recent Labs   Lab Test 06/10/21  1937   *   POTASSIUM 4.0   CHLORIDE 101   CO2 21   BUN 7   CR 0.78   ANIONGAP 10   GAY 9.2   GLC 64*     Most Recent 2 LFT's:  Recent Labs   Lab Test 06/10/21  1937   AST Canceled, Test credited   *   ALKPHOS 237*   BILITOTAL 0.8     Recent Results (from the past 24 hour(s))   POC US ABDOMEN LIMITED    Impression    Limited Bedside Abdominal Ultrasound, performed and interpreted by me.     Indication: abdominal pain. Evaluate for gallbladder disease.    Windows: RUQ: longitudinal, transverse.    Findings: No gallstones noted. No gallbladder wall thickening. No pericholecystic fluid.    IMPRESSION: No cholelithiasis. No evidence of cholecystitis.   CT Abdomen Pelvis w Contrast    Narrative    EXAM: CT ABDOMEN PELVIS W CONTRAST  LOCATION: [facility]  DATE/TIME: 6/10/2021 11:18 PM    INDICATION: RLQ abdominal pain, appendicitis suspected (Age >= 14y)  COMPARISON: None.  TECHNIQUE: CT scan of the abdomen and pelvis was performed following injection of IV contrast. Multiplanar reformats were obtained. Dose reduction techniques were used.  CONTRAST: 75 mL of Isovue-370.    FINDINGS:   LOWER CHEST: Trace amounts of pleural fluid with adjacent atelectasis.    HEPATOBILIARY: Normal.    PANCREAS: Normal.    SPLEEN: Normal.    ADRENAL GLANDS: Normal.    KIDNEYS/BLADDER: Symmetric enhancement. No hydronephrosis. Bladder is normal.    BOWEL: No mechanical bowel obstruction. No evidence of appendicitis. No free air. No focal bowel wall thickening.    LYMPH NODES: Normal.    VASCULATURE:  Unremarkable.    PELVIC ORGANS: Free fluid throughout the dependent aspects of the pelvis. Adnexal structures appear symmetric.    MUSCULOSKELETAL: Normal.      Impression    IMPRESSION:   1.  Pelvic free fluid, potentially physiologic in nature.    2.  No focal inflammatory change in the abdomen or pelvis. Appendix is nondilated.    3.  Tiny pleural effusions.

## 2021-06-11 NOTE — PLAN OF CARE
"Observation Goals      - Nausea/vomiting (diarrhea if present) improved: In progress, PRN Zofran x1  - Tolerating oral intake to maintain hydration: Not met, NPO, IVF infusing   - Vital signs normal or at patient baseline and orthostatic vitals are normal and patient not lightheaded with standing: Met   - Diagnostic tests and consults completed and resulted if ordered: Not met, GI consult pending  - Adequate pain control on oral analgesia: In progress, pt received PRN ibuprofen for abdominal pain  - Tolerating oral antibiotics if ordered: N/A   - Safe disposition plan has been identified: Pending     VS on RA. A&O. NPO. Pt reports 6/10 abdominal pain, PRN ibuprofen given. PRN Zofran given for nausea. Independent. R PIV infusing NS @ 125 ml/hr. Plan is for symptom management and GI consult    /71   Pulse 94   Temp 98.4  F (36.9  C) (Oral)   Resp 16   Ht 1.549 m (5' 1\")   Wt 55.5 kg (122 lb 4.8 oz)   SpO2 100%   BMI 23.11 kg/m      "

## 2021-06-11 NOTE — ED PROVIDER NOTES
"      [place] EMERGENCY DEPARTMENT ([facility])  Nena 10, 2021      ED Provider Note  [facility]      History     Chief Complaint   Patient presents with     Abdominal Pain     HPI  Jennifer Mejia is a 21 year old female who presents with abdominal pain.  Patient reports 5 days of epigastric abdominal pain, nausea, and vomiting.  Tactile fevers at home (she does not have a thermometer to check her temperature).  Abdominal pain became more diffuse today so she came to be evaluated.  She denies any diarrhea.  No dysuria.  No vaginal bleeding or discharge.  No prior abdominal surgical history.  She takes continuous birth control and therefore does not have menstrual periods.  She denies heavy alcohol use or other substance use.    Past Medical History  Past Medical History:   Diagnosis Date     Migraine      History reviewed. No pertinent surgical history.  Cetirizine HCl (ZYRTEC ALLERGY PO)  fluticasone (FLONASE) 50 MCG/ACT nasal spray  norethindrone-ethinyl estradiol (FEMHRT 1/5) 1-5 MG-MCG tablet      Allergies   Allergen Reactions     Methylprednisolone Hives, Itching and Other (See Comments)     Family History  History reviewed. No pertinent family history.  Social History   Social History     Tobacco Use     Smoking status: Never Smoker     Smokeless tobacco: Never Used   Substance Use Topics     Alcohol use: Yes     Comment: occasional     Drug use: Not Currently      Past medical history, past surgical history, medications, allergies, family history, and social history were reviewed with the patient. No additional pertinent items.       Review of Systems  A complete review of systems was performed with pertinent positives and negatives noted in the HPI, and all other systems negative.    Physical Exam   BP: 131/69  Pulse: 120  Temp: 99.9  F (37.7  C)  Resp: 18  Height: 154.9 cm (5' 1\")  Weight: 53.1 kg (117 lb)  SpO2: 95 %  Physical Exam  Vitals signs and nursing note " reviewed.   Constitutional:       General: She is not in acute distress.     Appearance: She is well-developed. She is not diaphoretic.   HENT:      Head: Normocephalic and atraumatic.      Mouth/Throat:      Pharynx: No oropharyngeal exudate.   Eyes:      General: No scleral icterus.     Pupils: Pupils are equal, round, and reactive to light.   Cardiovascular:      Rate and Rhythm: Regular rhythm. Tachycardia present.      Heart sounds: Normal heart sounds.   Pulmonary:      Effort: No respiratory distress.      Breath sounds: Normal breath sounds.   Abdominal:      General: Abdomen is flat. Bowel sounds are normal. There is no distension.      Palpations: Abdomen is soft.      Tenderness: There is abdominal tenderness in the epigastric area and periumbilical area. There is no guarding or rebound.   Musculoskeletal:         General: No tenderness.   Skin:     General: Skin is warm.      Capillary Refill: Capillary refill takes less than 2 seconds.      Findings: No rash.   Neurological:      General: No focal deficit present.      Mental Status: She is alert and oriented to person, place, and time.   Psychiatric:         Mood and Affect: Mood normal.         Behavior: Behavior normal.         ED Course     ED Course as of Rohan 10 2359   Thu Rohan 10, 2021   2202 Alkaline Phosphatase(!): 237   2202 ALT(!): 253   2202 Platelet Count(!): 117     Procedures  Results for orders placed during the hospital encounter of 06/10/21   POC US ABDOMEN LIMITED    Impression Limited Bedside Abdominal Ultrasound, performed and interpreted by me.     Indication: abdominal pain. Evaluate for gallbladder disease.    Windows: RUQ: longitudinal, transverse.    Findings: No gallstones noted. No gallbladder wall thickening. No pericholecystic fluid.    IMPRESSION: No cholelithiasis. No evidence of cholecystitis.               Results for orders placed or performed during the hospital encounter of 06/10/21   CT Abdomen Pelvis w Contrast      Status: None    Narrative    EXAM: CT ABDOMEN PELVIS W CONTRAST  LOCATION: [facility]  DATE/TIME: 6/10/2021 11:18 PM    INDICATION: RLQ abdominal pain, appendicitis suspected (Age >= 14y)  COMPARISON: None.  TECHNIQUE: CT scan of the abdomen and pelvis was performed following injection of IV contrast. Multiplanar reformats were obtained. Dose reduction techniques were used.  CONTRAST: 75 mL of Isovue-370.    FINDINGS:   LOWER CHEST: Trace amounts of pleural fluid with adjacent atelectasis.    HEPATOBILIARY: Normal.    PANCREAS: Normal.    SPLEEN: Normal.    ADRENAL GLANDS: Normal.    KIDNEYS/BLADDER: Symmetric enhancement. No hydronephrosis. Bladder is normal.    BOWEL: No mechanical bowel obstruction. No evidence of appendicitis. No free air. No focal bowel wall thickening.    LYMPH NODES: Normal.    VASCULATURE: Unremarkable.    PELVIC ORGANS: Free fluid throughout the dependent aspects of the pelvis. Adnexal structures appear symmetric.    MUSCULOSKELETAL: Normal.      Impression    IMPRESSION:   1.  Pelvic free fluid, potentially physiologic in nature.    2.  No focal inflammatory change in the abdomen or pelvis. Appendix is nondilated.    3.  Tiny pleural effusions.   POC US ABDOMEN LIMITED     Status: None    Impression    Limited Bedside Abdominal Ultrasound, performed and interpreted by me.     Indication: abdominal pain. Evaluate for gallbladder disease.    Windows: RUQ: longitudinal, transverse.    Findings: No gallstones noted. No gallbladder wall thickening. No pericholecystic fluid.    IMPRESSION: No cholelithiasis. No evidence of cholecystitis.   Lactate for Sepsis Protocol     Status: None   Result Value Ref Range    Lactate for Sepsis Protocol 1.3 0.7 - 2.0 mmol/L   Lipase     Status: Abnormal   Result Value Ref Range    Lipase 30 (L) 73 - 393 U/L   CBC with platelets differential     Status: Abnormal   Result Value Ref Range    WBC 8.1 4.0 - 11.0 10e9/L    RBC Count 4.96 3.8 - 5.2  10e12/L    Hemoglobin 14.4 11.7 - 15.7 g/dL    Hematocrit 42.9 35.0 - 47.0 %    MCV 87 78 - 100 fl    MCH 29.0 26.5 - 33.0 pg    MCHC 33.6 31.5 - 36.5 g/dL    RDW 11.8 10.0 - 15.0 %    Platelet Count 117 (L) 150 - 450 10e9/L    Diff Method Manual Differential     % Neutrophils 14.0 %    % Lymphocytes 65.0 %    % Monocytes 21.0 %    % Eosinophils 0.0 %    % Basophils 0.0 %    Absolute Neutrophil 1.1 (L) 1.6 - 8.3 10e9/L    Absolute Lymphocytes 5.3 0.8 - 5.3 10e9/L    Absolute Monocytes 1.7 (H) 0.0 - 1.3 10e9/L    Absolute Eosinophils 0.0 0.0 - 0.7 10e9/L    Absolute Basophils 0.0 0.0 - 0.2 10e9/L    Reactive Lymphs Present    Comprehensive metabolic panel     Status: Abnormal   Result Value Ref Range    Sodium 132 (L) 133 - 144 mmol/L    Potassium 4.0 3.4 - 5.3 mmol/L    Chloride 101 94 - 109 mmol/L    Carbon Dioxide 21 20 - 32 mmol/L    Anion Gap 10 3 - 14 mmol/L    Glucose 64 (L) 70 - 99 mg/dL    Urea Nitrogen 7 7 - 30 mg/dL    Creatinine 0.78 0.52 - 1.04 mg/dL    GFR Estimate >90 >60 mL/min/[1.73_m2]    GFR Estimate If Black >90 >60 mL/min/[1.73_m2]    Calcium 9.2 8.5 - 10.1 mg/dL    Bilirubin Total 0.8 0.2 - 1.3 mg/dL    Albumin 3.7 3.4 - 5.0 g/dL    Protein Total 7.8 6.8 - 8.8 g/dL    Alkaline Phosphatase 237 (H) 40 - 150 U/L     (H) 0 - 50 U/L    AST Canceled, Test credited 0 - 45 U/L   HCG quantitative pregnancy     Status: None   Result Value Ref Range    HCG Quantitative Serum <1 0 - 5 IU/L     Medications   0.9% sodium chloride BOLUS (0 mLs Intravenous Stopped 6/10/21 2140)   ondansetron (ZOFRAN) injection 4 mg (4 mg Intravenous Given 6/10/21 2138)   acetaminophen (TYLENOL) tablet 1,000 mg (1,000 mg Oral Given 6/10/21 2137)   0.9% sodium chloride BOLUS (1,000 mLs Intravenous New Bag 6/10/21 2146)   iopamidol (ISOVUE-370) solution 75 mL (75 mLs Intravenous Given 6/10/21 2320)   sodium chloride (PF) 0.9% PF flush 70 mL (70 mLs Intravenous Given 6/10/21 2320)        Assessments & Plan (with Medical  Decision Making)   Patient was seen and examined.  Labs were drawn.  IV fluids, Zofran, and Tylenol were given.  Blood work revealed a normal WBC, low lipase, normal lactic acid.  ALT was elevated at 253, alk phos 237, AST hemolyzed (recent).  Total bilirubin normal.  Bedside right upper quadrant ultrasound showed normal-appearing gallbladder.  CT abdomen and pelvis was ordered.  Showed no acute abnormalities, in particular and normal-appearing liver.  Patient will be admitted to ED observation for additional labs and hepatology consult.  Patient is agreeable with this plan.  She remained hemodynamically stable in the emergency department.    I have reviewed the nursing notes. I have reviewed the findings, diagnosis, plan and need for follow up with the patient.    New Prescriptions    No medications on file       Final diagnoses:   Abdominal pain, epigastric   Elevated transaminase level       --  Marge LARA [facility] EMERGENCY DEPARTMENT  6/10/2021     Marge Bettencourt DO  06/10/21 [id number]

## 2021-06-11 NOTE — DISCHARGE SUMMARY
Discharge instructions reviewed, understood, and signed by patient. VSS, PIV removed, Printed prescription given to patient. patient has all belongings. Patient plans to follow up in clinic for recheck labs.

## 2021-06-11 NOTE — CONSULTS
GASTROENTEROLOGY CONSULTATION      Hepatology consult    Consult requested by Nelson Metzger*    Reason for consult: Abdominal pain with elevated LFTS    History is obtained from the patient    Date of Admission:  6/10/2021         History of Present Illness:   Jennifer Mejia is a 21 year old female with a history of seasonal allergies and migraines who was admitted with abdominal pain, nausea and vomiting. We are consulting for abdominal pain with elevated LFTs.    Jennifer's symptoms began with muscle aches and headache before developing five days of upper abdominal pain, nausea, and vomiting. Her upper abdominal pain radiates to her back, is dull in quality. It is worse after eating or drinking, not affected by TUMs, better with tylenol. It has been accompanied by increased burping, which improves the pain. She has also has an intermittent fever, sinus pressure, and earaches. She has not had a runny nose or sore throat. Her sinus pressure/ear pain feels different from her seasonal allergies, which often present with runny nose.     She endorses alternating Aleve and Tylenol for management of pain over the past week, taking total 2g of Tylenol per day. She endorses limited alcohol consumption, having one glass of wine one week ago. She denies recent sick contacts. She denies taking any new medications or herbal supplements. She denies illicit drug use. She denies recent travel, recent time outdoors, mosquito or tick bites, recent contact with new animals; she has one dog who is 1.5 years old. She denies new sexual partners or unprotected sex; she regularly uses condoms. She denies vaginal discharge, history of STI. She denies history of acid reflux. She has no history of abdominal surgery.         Social History:     Social History     Tobacco Use    Smoking status: Never Smoker    Smokeless tobacco: Never Used   Substance Use Topics    Alcohol use: Yes     Comment: occasional    Drug use: Not  "Currently            Family History:   No family history of liver disease, gallstones.    Hashimoto's Disease: Grandmother          Past Medical, Surgical and Procedural History (Summarized):   Pertinent Medical History:  Seasonal allergies  Migraines    Abdominal and Pelvis Surgeries:  None         Medications:     Medications Prior to Admission   Medication Sig Dispense Refill Last Dose    Cetirizine HCl (ZYRTEC ALLERGY PO) Take 10 mg by mouth daily       fluticasone (FLONASE) 50 MCG/ACT nasal spray Spray 2 sprays in nostril daily       norethindrone-ethinyl estradiol (FEMHRT 1/5) 1-5 MG-MCG tablet Take 1 tablet by mouth daily        Additional medications:  Magnesium  Riboflavin  Vitamin D  Tylenol, 500mg, x2 BID for past week  Aleve         Previous Endoscopy:   EGD   None    Colonoscopy  None         Review of Systems:   A complete 10 point review of systems was obtained.  Please see the HPI for pertinent positives and negatives.  All other systems were reviewed and were found to be negative.          Physical Exam:   /71   Pulse 94   Temp 98.4  F (36.9  C) (Oral)   Resp 16   Ht 1.549 m (5' 1\")   Wt 55.5 kg (122 lb 4.8 oz)   SpO2 100%   BMI 23.11 kg/m    Wt:   Wt Readings from Last 2 Encounters:   06/11/21 55.5 kg (122 lb 4.8 oz)      Constitutional: Cooperative, pleasant, no apparent distress.  HEENT: No conjunctival icterus, moist mucus membranes, no nasal drainage  CV: Normal rate and rhythm.  No edema.  Respiratory: Unlabored breathing on room air, no wheezes or crackles  Abdomen: Tenderness upper quadrant, stronger tenderness RUQ, bowel sounds quiet  Skin: No jaundice, no rash, no lesions, warm  Neuro: Alert, moves all extremities equally  Psych: Normal affect, answers questions appropriately.          Data (Summarized):   Notable labs:  Albumin: 2.8 (L)  Total protein: 5.9 (L)  Total bilirubin: 0.8  Alk Phos: 205 (H)  ALT: 227 (H)  AST: 207 (H)  Lipase: 30 (L)  INR: 1.12    WBC: " 5.2  Platelets: 92 (L)  Hemoglobin: 11.8 (L)  Lactate: 1.3    Urea: 7  Creatinine: 0.71  Estimated GFR: >90    Urine:  Ketones: 150 (H)  Protein: 20 (H)    Viruses:  Hepatitis B IGM (surface): Neg  Hepatitis C: Neg    Hepatitis A IGM: Pending  CMV IGM: Pending  EBV IGG (early antigen): Pending  Mononucleosis screening: Pending    Imaging reviewed:  Abdominal US with Doppler 06/11/2021  FINDINGS:  Liver: Normal homogeneous echotexture. No evidence of a focal hepatic mass. Measuring up to 19 cm in craniocaudal dimension. Flow in all vessels anterograde.  Spleen: The splenic vein is patent and flow is towards the liver. The spleen measures 12.4 cm in length.  Fluid: No evidence of ascites or pleural effusions    IMPRESSION:  1.  Normal abdominal ultrasound with Doppler assessment.  2.  Mild splenomegaly.    Abdominal CT with contrast (06/10/2021)  FINDINGS:  Hepatobiliary: Normal  Pancreas: Normal  Lower chest: Trace amounts pleural fluid with adjacent atelectasis    IMPRESSION:   1.  Pelvic free fluid, potentially physiologic in nature.   2.  No focal inflammatory change in the abdomen or pelvis. Appendix is nondilated.   3.  Tiny pleural effusions.    Abdominal US (Limited) 06/10/2021  IMPRESSION: No cholelithiasis. No evidence of cholecystitis.           ASSESSMENT AND PLAN:   Jennifer Mejia is a 21 year old female with a history of seasonal allergies and migraines who was admitted with abdominal pain, nausea and vomiting. We are consulting for abdominal pain with elevated LFTs.    # Mixed elevation of LFTs  # Upper abdominal pain, nausea, vomiting, fever  - The patient had a prodrome of headache, myalgia and fevers.   - Viral hepatitis is likely given low platelets likely 2/2 splenomegaly on abdominal US, and fever, muscle aches, earaches, and headaches. Following up mono testing  - Drug toxicity seems less likely given non-excessive NSAID use/herbal supplements/new medications. She does not have any gallstones  or biliary dilation that would suggest choledocholithiasis/cholangitis.     RECOMMENDATIONS:  -- Hepatitis B core IgM, hepatitis A IgM  -- Continue supportive care  -- Advance diet as tolerated  -- OP follow up with hepatology to follow LFTs (ordered for you).  If elevated LFTs persist, will complete further work up for this    The case was staffed with attending, Dr. West.  Hepatology will continue to follow.    Deirdre Azul, MS4  Faviola Orozco, Fellow

## 2021-06-27 ENCOUNTER — HEALTH MAINTENANCE LETTER (OUTPATIENT)
Age: 21
End: 2021-06-27

## 2021-06-30 DIAGNOSIS — R10.13 ABDOMINAL PAIN, EPIGASTRIC: ICD-10-CM

## 2021-06-30 DIAGNOSIS — R74.01 ELEVATED TRANSAMINASE LEVEL: ICD-10-CM

## 2021-06-30 LAB
ALBUMIN SERPL-MCNC: 3.5 G/DL (ref 3.4–5)
ALP SERPL-CCNC: 151 U/L (ref 40–150)
ALT SERPL W P-5'-P-CCNC: 35 U/L (ref 0–50)
ANION GAP SERPL CALCULATED.3IONS-SCNC: 9 MMOL/L (ref 3–14)
AST SERPL W P-5'-P-CCNC: 21 U/L (ref 0–45)
BILIRUB SERPL-MCNC: 0.4 MG/DL (ref 0.2–1.3)
BUN SERPL-MCNC: 13 MG/DL (ref 7–30)
CALCIUM SERPL-MCNC: 8.6 MG/DL (ref 8.5–10.1)
CHLORIDE SERPL-SCNC: 106 MMOL/L (ref 94–109)
CO2 SERPL-SCNC: 25 MMOL/L (ref 20–32)
CREAT SERPL-MCNC: 0.69 MG/DL (ref 0.52–1.04)
ERYTHROCYTE [DISTWIDTH] IN BLOOD BY AUTOMATED COUNT: 11.9 % (ref 10–15)
GFR SERPL CREATININE-BSD FRML MDRD: >90 ML/MIN/{1.73_M2}
GLUCOSE SERPL-MCNC: 102 MG/DL (ref 70–99)
HCT VFR BLD AUTO: 36 % (ref 35–47)
HGB BLD-MCNC: 12.3 G/DL (ref 11.7–15.7)
INR PPP: 1 (ref 0.86–1.14)
MCH RBC QN AUTO: 29.1 PG (ref 26.5–33)
MCHC RBC AUTO-ENTMCNC: 34.2 G/DL (ref 31.5–36.5)
MCV RBC AUTO: 85 FL (ref 78–100)
PLATELET # BLD AUTO: 366 10E9/L (ref 150–450)
POTASSIUM SERPL-SCNC: 3.7 MMOL/L (ref 3.4–5.3)
PROT SERPL-MCNC: 7.9 G/DL (ref 6.8–8.8)
RBC # BLD AUTO: 4.23 10E12/L (ref 3.8–5.2)
SODIUM SERPL-SCNC: 140 MMOL/L (ref 133–144)
WBC # BLD AUTO: 7.9 10E9/L (ref 4–11)

## 2021-06-30 PROCEDURE — 36415 COLL VENOUS BLD VENIPUNCTURE: CPT | Performed by: PATHOLOGY

## 2021-06-30 PROCEDURE — 85610 PROTHROMBIN TIME: CPT | Performed by: PATHOLOGY

## 2021-06-30 PROCEDURE — 80053 COMPREHEN METABOLIC PANEL: CPT | Performed by: PATHOLOGY

## 2021-06-30 PROCEDURE — 85027 COMPLETE CBC AUTOMATED: CPT | Performed by: PATHOLOGY

## 2021-07-07 ENCOUNTER — VIRTUAL VISIT (OUTPATIENT)
Dept: GASTROENTEROLOGY | Facility: CLINIC | Age: 21
End: 2021-07-07
Attending: STUDENT IN AN ORGANIZED HEALTH CARE EDUCATION/TRAINING PROGRAM
Payer: COMMERCIAL

## 2021-07-07 DIAGNOSIS — R79.89 ELEVATED LFTS: Primary | ICD-10-CM

## 2021-07-07 DIAGNOSIS — R16.1 SPLENOMEGALY: ICD-10-CM

## 2021-07-07 DIAGNOSIS — R10.13 ABDOMINAL PAIN, EPIGASTRIC: ICD-10-CM

## 2021-07-07 PROCEDURE — 99204 OFFICE O/P NEW MOD 45 MIN: CPT | Mod: 95 | Performed by: STUDENT IN AN ORGANIZED HEALTH CARE EDUCATION/TRAINING PROGRAM

## 2021-07-07 NOTE — LETTER
Date:September 9, 2021      Patient was self referred, no letter generated. Do not send.        [facility] Health Information

## 2021-07-07 NOTE — LETTER
7/7/2021         RE: Jennifer Mejia  [address] [place] [address]        Dear Colleague,    Thank you for referring your patient, Jennifer Mejia, to the [facility] [place]. Please see a copy of my visit note below.    [facility] Return Patient Visit    Date of Visit: July 7, 2021    Reason for referral: Follow up elevated LFTs and splenomegaly    Subjective: Ms. Mejia is a 21 year old woman with a history of seasonal allergies and migraines who presents for follow up of an ED visit for abdominal pain, nausea and vomiting with elevated LFTs.     Initial History:  Jennifer's symptoms began with muscle aches and headache before developing five days of upper abdominal pain, nausea, and vomiting. Her upper abdominal pain radiates to her back, is dull in quality. It is worse after eating or drinking, not affected by TUMs, better with tylenol. It has been accompanied by increased burping, which improves the pain. She has also has an intermittent fever, sinus pressure, and earaches. She has not had a runny nose or sore throat. Her sinus pressure/ear pain feels different from her seasonal allergies, which often present with runny nose.      She endorses alternating Aleve and Tylenol for management of pain over the past week, taking total 2g of Tylenol per day. She endorses limited alcohol consumption, having one glass of wine one week ago. She denies recent sick contacts. She denies taking any new medications or herbal supplements.     Interval Events  - Got labs with her PCP after discharge that showed uptrending LFTs. Now LFTs normal and platelet count improved  - Now feels back to normal self - nausea lingered a little bit but is now gone. No abdominal pain  - Was having headaches after being in the ED, after stopping taking riboflavin B2 and magnesium and sumtriptan. Now back to normal    ROS: 14 point ROS negative except for positives noted in  HPI.    PMHx:  Past Medical History:   Diagnosis Date     Migraine    Seasonal Allergies    PSHx:  None    FamHx:  No family history of liver disease, liver cancer  Hashimoto's Disease: Grandmother    SocHx:  Social History     Socioeconomic History     Marital status: Single     Spouse name: Not on file     Number of children: Not on file     Years of education: Not on file     Highest education level: Not on file   Occupational History     Not on file   Social Needs     Financial resource strain: Not on file     Food insecurity     Worry: Not on file     Inability: Not on file     Transportation needs     Medical: Not on file     Non-medical: Not on file   Tobacco Use     Smoking status: Never Smoker     Smokeless tobacco: Never Used   Substance and Sexual Activity     Alcohol use: Yes     Comment: occasional     Drug use: Not Currently     Sexual activity: Yes     Birth control/protection: Condom, Pill   Lifestyle     Physical activity     Days per week: Not on file     Minutes per session: Not on file     Stress: Not on file   Relationships     Social connections     Talks on phone: Not on file     Gets together: Not on file     Attends Religion service: Not on file     Active member of club or organization: Not on file     Attends meetings of clubs or organizations: Not on file     Relationship status: Not on file     Intimate partner violence     Fear of current or ex partner: Not on file     Emotionally abused: Not on file     Physically abused: Not on file     Forced sexual activity: Not on file   Other Topics Concern     Not on file   Social History Narrative     Not on file       Medications:  Current Outpatient Medications   Medication     Cetirizine HCl (ZYRTEC ALLERGY PO)     fluticasone (FLONASE) 50 MCG/ACT nasal spray     norethindrone-ethinyl estradiol (FEMHRT 1/5) 1-5 MG-MCG tablet     ondansetron (ZOFRAN-ODT) 4 MG ODT tab     No current facility-administered medications for this visit.         Allergies:  Allergies   Allergen Reactions     Methylprednisolone Hives, Itching and Other (See Comments)       Objective:  There were no vitals taken for this visit.  Constitutional: pleasant woman in NAD  Eyes: non icteric  Respiratory: Normal respiratory excursion   MSK: normal range of motion of visualized extremities  Abd: Non distended  Skin: No jaundice  Psychiatric: normal mood and orientation    Labs:  Last Comprehensive Metabolic Panel:  Sodium   Date Value Ref Range Status   06/30/2021 140 133 - 144 mmol/L Final     Potassium   Date Value Ref Range Status   06/30/2021 3.7 3.4 - 5.3 mmol/L Final     Chloride   Date Value Ref Range Status   06/30/2021 106 94 - 109 mmol/L Final     Carbon Dioxide   Date Value Ref Range Status   06/30/2021 25 20 - 32 mmol/L Final     Anion Gap   Date Value Ref Range Status   06/30/2021 9 3 - 14 mmol/L Final     Glucose   Date Value Ref Range Status   06/30/2021 102 (H) 70 - 99 mg/dL Final     Urea Nitrogen   Date Value Ref Range Status   06/30/2021 13 7 - 30 mg/dL Final     Creatinine   Date Value Ref Range Status   06/30/2021 0.69 0.52 - 1.04 mg/dL Final     GFR Estimate   Date Value Ref Range Status   06/30/2021 >90 >60 mL/min/[1.73_m2] Final     Comment:     Non  GFR Calc  Starting 12/18/2018, serum creatinine based estimated GFR (eGFR) will be   calculated using the Chronic Kidney Disease Epidemiology Collaboration   (CKD-EPI) equation.       Calcium   Date Value Ref Range Status   06/30/2021 8.6 8.5 - 10.1 mg/dL Final     Bilirubin Total   Date Value Ref Range Status   06/30/2021 0.4 0.2 - 1.3 mg/dL Final     Alkaline Phosphatase   Date Value Ref Range Status   06/30/2021 151 (H) 40 - 150 U/L Final     ALT   Date Value Ref Range Status   06/30/2021 35 0 - 50 U/L Final     AST   Date Value Ref Range Status   06/30/2021 21 0 - 45 U/L Final       Lab Results   Component Value Date    WBC 7.9 06/30/2021     Lab Results   Component Value Date    RBC 4.23  06/30/2021     Lab Results   Component Value Date    HGB 12.3 06/30/2021     Lab Results   Component Value Date    HCT 36.0 06/30/2021     Lab Results   Component Value Date    MCV 85 06/30/2021     Lab Results   Component Value Date    MCH 29.1 06/30/2021     Lab Results   Component Value Date    MCHC 34.2 06/30/2021     Lab Results   Component Value Date    RDW 11.9 06/30/2021     Lab Results   Component Value Date     06/30/2021       INR   Date Value Ref Range Status   06/30/2021 1.00 0.86 - 1.14 Final      Imaging:    RUQ US 6/2021    Liver: The liver demonstrates normal homogeneous echotexture. No  evidence of a focal hepatic mass. Measuring up to 19 cm in  craniocaudal dimension.     Extrahepatic portal vein flow is antegrade at 40.4 cm/s.  Right portal vein flow is antegrade, measuring 19 cm/s.  Left portal vein flow is antegrade, measuring 8 cm/s.     Flow in the hepatic artery is towards the liver and:  81.4 cm/s peak systolic  0.5 resistive index.      The splenic vein is patent and flow is towards the liver.  The left,  middle, and right hepatic veins are patent with flow towards the IVC.  The IVC is patent with flow towards the heart.   The visualized aorta  is not dilated. Visualized common iliac arteries are normal in  diameter.     Gallbladder: There is no wall thickening, pericholecystic fluid,  positive sonographic Nielsen's sign or evidence for cholelithiasis     Bile Ducts: Both the intra- and extrahepatic biliary system are of  normal caliber.  The common bile duct measures 2 mm.     Pancreas: Visualized portions of the head and body of the pancreas are  unremarkable.      Kidneys: Both kidneys are of normal echotexture, without mass or  hydronephrosis.   Renal lengths: right- 11.2 cm, left- 11.0 cm.     Spleen: The spleen measures 12.4 cm in length.     Fluid: No evidence of ascites or pleural effusions.                                                                      Impression:   1.   Normal abdominal ultrasound with Doppler assessment.  2.  Mild splenomegaly.     CT AP 6/2021    IMPRESSION:   1.  Pelvic free fluid, potentially physiologic in nature.     2.  No focal inflammatory change in the abdomen or pelvis. Appendix is nondilated.     3.  Tiny pleural effusions.    Independently reviewed labs and imaging.     Assessment/Plan: Ms. Mejia is a 21 year old woman with a history of seasonal allergies and migraines who presents for follow up of an ED visit for abdominal pain, nausea and vomiting with elevated LFTs.     Viral hepatitis labs were negative. She had abdominal imaging that only showed splenomegaly. Overall, felt her picture is most consistent with a viral syndrome, likely mono. She had labs with her PCP after her discharge, they were increasing, but are now back to normal. She also feels back to normal.     Do not think she has evidence of chronic liver disease. Recommend follow up imaging to assess for resolution in splenomegaly, suspect this will be the case with her platelet count back to normal.     - LFTs, and US to assess liver and splenomegaly in 3 months  - Discuss safe alcohol use in women     RTC PRN after labs/US    Viviane West MD MS  Hepatology/Liver Transplant  [facility]        Jennifer is a 21 year old who is being evaluated via a billable video visit.      How would you like to obtain your AVS? MyChart  If the video visit is dropped, the invitation should be resent by: Text to cell phone: [phone]  Will anyone else be joining your video visit? No      Video Start Time: 4:37 PM  Video-Visit Details    Type of service:  Video Visit    Video End Time:4:50 PM    Originating Location (pt. Location): Home    Distant Location (provider location):  [facility] [place]     Platform used for Video Visit: Krista        Again, thank you for allowing me to participate in the care of your patient.        Sincerely,        Viviane West  MD

## 2021-07-07 NOTE — PROGRESS NOTES
Jennifer is a 21 year old who is being evaluated via a billable video visit.      How would you like to obtain your AVS? MyChart  If the video visit is dropped, the invitation should be resent by: Text to cell phone: [phone]  Will anyone else be joining your video visit? No      Video Start Time: 4:37 PM  Video-Visit Details    Type of service:  Video Visit    Video End Time:4:50 PM    Originating Location (pt. Location): Home    Distant Location (provider location):  [facility] [place]     Platform used for Video Visit: documistic

## 2021-07-07 NOTE — PROGRESS NOTES
[facility] Return Patient Visit    Date of Visit: July 7, 2021    Reason for referral: Follow up elevated LFTs and splenomegaly    Subjective: Ms. Mejia is a 21 year old woman with a history of seasonal allergies and migraines who presents for follow up of an ED visit for abdominal pain, nausea and vomiting with elevated LFTs.     Initial History:  Herlinda symptoms began with muscle aches and headache before developing five days of upper abdominal pain, nausea, and vomiting. Her upper abdominal pain radiates to her back, is dull in quality. It is worse after eating or drinking, not affected by TUMs, better with tylenol. It has been accompanied by increased burping, which improves the pain. She has also has an intermittent fever, sinus pressure, and earaches. She has not had a runny nose or sore throat. Her sinus pressure/ear pain feels different from her seasonal allergies, which often present with runny nose.      She endorses alternating Aleve and Tylenol for management of pain over the past week, taking total 2g of Tylenol per day. She endorses limited alcohol consumption, having one glass of wine one week ago. She denies recent sick contacts. She denies taking any new medications or herbal supplements.     Interval Events  - Got labs with her PCP after discharge that showed uptrending LFTs. Now LFTs normal and platelet count improved  - Now feels back to normal self - nausea lingered a little bit but is now gone. No abdominal pain  - Was having headaches after being in the ED, after stopping taking riboflavin B2 and magnesium and sumtriptan. Now back to normal    ROS: 14 point ROS negative except for positives noted in HPI.    PMHx:  Past Medical History:   Diagnosis Date     Migraine    Seasonal Allergies    PSHx:  None    FamHx:  No family history of liver disease, liver cancer  Hashimoto's Disease: Grandmother    SocHx:  Social History     Socioeconomic History     Marital  status: Single     Spouse name: Not on file     Number of children: Not on file     Years of education: Not on file     Highest education level: Not on file   Occupational History     Not on file   Social Needs     Financial resource strain: Not on file     Food insecurity     Worry: Not on file     Inability: Not on file     Transportation needs     Medical: Not on file     Non-medical: Not on file   Tobacco Use     Smoking status: Never Smoker     Smokeless tobacco: Never Used   Substance and Sexual Activity     Alcohol use: Yes     Comment: occasional     Drug use: Not Currently     Sexual activity: Yes     Birth control/protection: Condom, Pill   Lifestyle     Physical activity     Days per week: Not on file     Minutes per session: Not on file     Stress: Not on file   Relationships     Social connections     Talks on phone: Not on file     Gets together: Not on file     Attends [employer] service: Not on file     Active member of club or organization: Not on file     Attends meetings of clubs or organizations: Not on file     Relationship status: Not on file     Intimate partner violence     Fear of current or ex partner: Not on file     Emotionally abused: Not on file     Physically abused: Not on file     Forced sexual activity: Not on file   Other Topics Concern     Not on file   Social History Narrative     Not on file       Medications:  Current Outpatient Medications   Medication     Cetirizine HCl (ZYRTEC ALLERGY PO)     fluticasone (FLONASE) 50 MCG/ACT nasal spray     norethindrone-ethinyl estradiol (FEMHRT 1/5) 1-5 MG-MCG tablet     ondansetron (ZOFRAN-ODT) 4 MG ODT tab     No current facility-administered medications for this visit.        Allergies:  Allergies   Allergen Reactions     Methylprednisolone Hives, Itching and Other (See Comments)       Objective:  There were no vitals taken for this visit.  Constitutional: pleasant woman in NAD  Eyes: non icteric  Respiratory: Normal respiratory  excursion   MSK: normal range of motion of visualized extremities  Abd: Non distended  Skin: No jaundice  Psychiatric: normal mood and orientation    Labs:  Last Comprehensive Metabolic Panel:  Sodium   Date Value Ref Range Status   06/30/2021 140 133 - 144 mmol/L Final     Potassium   Date Value Ref Range Status   06/30/2021 3.7 3.4 - 5.3 mmol/L Final     Chloride   Date Value Ref Range Status   06/30/2021 106 94 - 109 mmol/L Final     Carbon Dioxide   Date Value Ref Range Status   06/30/2021 25 20 - 32 mmol/L Final     Anion Gap   Date Value Ref Range Status   06/30/2021 9 3 - 14 mmol/L Final     Glucose   Date Value Ref Range Status   06/30/2021 102 (H) 70 - 99 mg/dL Final     Urea Nitrogen   Date Value Ref Range Status   06/30/2021 13 7 - 30 mg/dL Final     Creatinine   Date Value Ref Range Status   06/30/2021 0.69 0.52 - 1.04 mg/dL Final     GFR Estimate   Date Value Ref Range Status   06/30/2021 >90 >60 mL/min/[1.73_m2] Final     Comment:     Non  GFR Calc  Starting 12/18/2018, serum creatinine based estimated GFR (eGFR) will be   calculated using the Chronic Kidney Disease Epidemiology Collaboration   (CKD-EPI) equation.       Calcium   Date Value Ref Range Status   06/30/2021 8.6 8.5 - 10.1 mg/dL Final     Bilirubin Total   Date Value Ref Range Status   06/30/2021 0.4 0.2 - 1.3 mg/dL Final     Alkaline Phosphatase   Date Value Ref Range Status   06/30/2021 151 (H) 40 - 150 U/L Final     ALT   Date Value Ref Range Status   06/30/2021 35 0 - 50 U/L Final     AST   Date Value Ref Range Status   06/30/2021 21 0 - 45 U/L Final       Lab Results   Component Value Date    WBC 7.9 06/30/2021     Lab Results   Component Value Date    RBC 4.23 06/30/2021     Lab Results   Component Value Date    HGB 12.3 06/30/2021     Lab Results   Component Value Date    HCT 36.0 06/30/2021     Lab Results   Component Value Date    MCV 85 06/30/2021     Lab Results   Component Value Date    MCH 29.1 06/30/2021      Lab Results   Component Value Date    MCHC 34.2 06/30/2021     Lab Results   Component Value Date    RDW 11.9 06/30/2021     Lab Results   Component Value Date     06/30/2021       INR   Date Value Ref Range Status   06/30/2021 1.00 0.86 - 1.14 Final      Imaging:    RUQ US 6/2021    Liver: The liver demonstrates normal homogeneous echotexture. No  evidence of a focal hepatic mass. Measuring up to 19 cm in  craniocaudal dimension.     Extrahepatic portal vein flow is antegrade at 40.4 cm/s.  Right portal vein flow is antegrade, measuring 19 cm/s.  Left portal vein flow is antegrade, measuring 8 cm/s.     Flow in the hepatic artery is towards the liver and:  81.4 cm/s peak systolic  0.5 resistive index.      The splenic vein is patent and flow is towards the liver.  The left,  middle, and right hepatic veins are patent with flow towards the IVC.  The IVC is patent with flow towards the heart.   The visualized aorta  is not dilated. Visualized common iliac arteries are normal in  diameter.     Gallbladder: There is no wall thickening, pericholecystic fluid,  positive sonographic Nielsen's sign or evidence for cholelithiasis     Bile Ducts: Both the intra- and extrahepatic biliary system are of  normal caliber.  The common bile duct measures 2 mm.     Pancreas: Visualized portions of the head and body of the pancreas are  unremarkable.      Kidneys: Both kidneys are of normal echotexture, without mass or  hydronephrosis.   Renal lengths: right- 11.2 cm, left- 11.0 cm.     Spleen: The spleen measures 12.4 cm in length.     Fluid: No evidence of ascites or pleural effusions.                                                                      Impression:   1.  Normal abdominal ultrasound with Doppler assessment.  2.  Mild splenomegaly.     CT AP 6/2021    IMPRESSION:   1.  Pelvic free fluid, potentially physiologic in nature.     2.  No focal inflammatory change in the abdomen or pelvis. Appendix is  nondilated.     3.  Tiny pleural effusions.    Independently reviewed labs and imaging.     Assessment/Plan: Ms. Mejia is a 21 year old woman with a history of seasonal allergies and migraines who presents for follow up of an ED visit for abdominal pain, nausea and vomiting with elevated LFTs.     Viral hepatitis labs were negative. She had abdominal imaging that only showed splenomegaly. Overall, felt her picture is most consistent with a viral syndrome, likely mono. She had labs with her PCP after her discharge, they were increasing, but are now back to normal. She also feels back to normal.     Do not think she has evidence of chronic liver disease. Recommend follow up imaging to assess for resolution in splenomegaly, suspect this will be the case with her platelet count back to normal.     - LFTs, and US to assess liver and splenomegaly in 3 months  - Discuss safe alcohol use in women     RTC PRN after labs/US    Viviane West MD MS  Hepatology/Liver Transplant  [facility]

## 2021-07-08 ENCOUNTER — TELEPHONE (OUTPATIENT)
Dept: GASTROENTEROLOGY | Facility: CLINIC | Age: 21
End: 2021-07-08

## 2021-07-08 NOTE — TELEPHONE ENCOUNTER
----- Message from Viviane West MD sent at 7/8/2021  1:51 PM CDT -----  Hey - can we send her order for LFTs and an US to     [facility] [address] [place] [address]

## 2021-07-08 NOTE — TELEPHONE ENCOUNTER
Faxed orders for LFT's and US to [facility] on [address] at the request of Dr. West.     Pt updated via OrderWithMe.    Rashmi SANDERS LPN  Hepatology Clinic

## 2021-10-17 ENCOUNTER — HEALTH MAINTENANCE LETTER (OUTPATIENT)
Age: 21
End: 2021-10-17

## 2022-07-24 ENCOUNTER — HEALTH MAINTENANCE LETTER (OUTPATIENT)
Age: 22
End: 2022-07-24

## 2022-10-03 ENCOUNTER — HEALTH MAINTENANCE LETTER (OUTPATIENT)
Age: 22
End: 2022-10-03

## 2023-08-12 ENCOUNTER — HEALTH MAINTENANCE LETTER (OUTPATIENT)
Age: 23
End: 2023-08-12